# Patient Record
Sex: MALE | Race: WHITE | Employment: OTHER | URBAN - NONMETROPOLITAN AREA
[De-identification: names, ages, dates, MRNs, and addresses within clinical notes are randomized per-mention and may not be internally consistent; named-entity substitution may affect disease eponyms.]

---

## 2020-10-21 ENCOUNTER — HOSPITAL ENCOUNTER (OUTPATIENT)
Dept: OCCUPATIONAL THERAPY | Age: 80
Setting detail: THERAPIES SERIES
Discharge: HOME | End: 2020-10-21
Payer: MEDICARE

## 2021-11-09 ENCOUNTER — HOSPITAL ENCOUNTER (OUTPATIENT)
Dept: OCCUPATIONAL THERAPY | Age: 81
Setting detail: THERAPIES SERIES
End: 2021-11-09
Payer: MEDICARE

## 2021-11-10 ENCOUNTER — HOSPITAL ENCOUNTER (OUTPATIENT)
Dept: OCCUPATIONAL THERAPY | Age: 81
Setting detail: THERAPIES SERIES
Discharge: HOME | End: 2021-11-10
Payer: MEDICARE

## 2021-11-10 PROCEDURE — 97165 OT EVAL LOW COMPLEX 30 MIN: CPT

## 2021-11-10 NOTE — DISCHARGE SUMMARY
[address]  DRIVING EVALUATION    PATIENT: Devi Mcnair OF BIRTH:  1940  GENDER:  male  CSN: [id number]   REFERRING PHYSICIAN:   CALI Calderón CNP  DIAGNOSIS:  R41.89 cognitive change  DRIVING HISTORY:   Reports that the driving evaluation was ordered due to a medication that he is currently taking. Wife answering questions for history, patient contributing. Wife reports that patient drives 0-3O a week, within a mile of the home. PMH: Please see medical history questionnaire for past medical history, allergies, and medications. INSURANCE INFORMATION: Medicare  PATIENT GOALS:    Continue driving    OBJECTIVE  VISUAL SKILLS(using the OPTOptiNose 2000 Visual Evaluator)       FAR VISUAL ACUITY:   Pass. Right eye 20/40, Left eye 20/40, bilateral 20/40     STEREO DEPTH:   Fail. FUSION:    Pass. Arrow pointing to number 9    PERIPHERAL VISION:  Pass. Able to detect a flash of light on the right and the left at nasal, 55, 70 and 85 degrees. DYNAVISION TESTING:  Mode A 60 seconds self paced interval with B UE and 30 hits. Considered slow for this activity. PHYSICAL SKILLS  RANGE OF MOTION:Within functional limits for driving  STRENGTH: Within functional limits for driving  TRANSFER IN/OUT OF SIMULATOR: Within functional limits for driving  AMBULATION: Within functional limits for driving    IN VEHICLE MANIPULATION SKILLS:  Steering Wheel:Within functional limits for driving   Gas Pedal:  Within functional limits for driving  Brake Pedal: During the reaction time simulation, patient placing foot on the gas and the brake simulataneously.   Turn Signal: NT  Seat Belt:NT     COGNITIVE SKILLS  ORIENTATION:  Within functional limits for driving  ATTENTION SPAN:Impaired: Decreased ability for divided attention demonstrated this date, both pen and paper and during driving simulation  FRUSTRATION TOLERANCE:Within functional limits for driving  IMPULSIVITY:Within functional limits for driving  DIRECTION FOLLOWING:Within functional limits for driving  R/L DISCRIMINATION:Within functional limits for driving  MEMORY:Within functional limits for driving      COGNITIVE TESTING:  Trail Making Part A: 70 seconds (average 29 seconds, deficient is greater than 78 seconds)  Trail Making Part B: Unable to correctly complete (just made a trail with numbers only)  Clock Test: 3/4     SHORT BLESSED TEST:   The Short Blessed Test is a screening tool to assess orientation, short term memory, long term memory, and reversal of learning. Overall this patient received a score of  0-4 which indicates normal cognition. Jessica Holbrook scored a 2 this date.  (required a prompt for reversal of months of the year)    IF SCORE IS IMPAIRED OR QUESTIONABLY IMPAIRED (See Below):    Patient's strengths were:  [x] short-term memory    [] long-term memory  [] reversal of learning   [x] orientation. SIMULATED DRIVING ASSESSMENT:  WARM-UP:    (54 MPH, 2 kay[address] highway with several curves. Light on-coming traffic. There are no intersections, pedestrians, cross traffic, slow traffic, etc.)    Total off road crashes: 0 (Good performance is 0)   Total collisions with vehicles and roadway objects: 0 (Good performance is 0)   Percentage of time over the posted speed limit: 1% (Good performance is within +/- 5% of the posted speed limit.)   Percentage of time out of lanes: 11% (Good performance would be less than 5%, moderate is less than 10%, greater than 10% is considered poor.)   Note: no repetition of instruction required     BRAKE REACTION TIME  (The brake reaction time test consists of presenting a large stop sign in the center of the visual display.  The appropriate response if for the  to release the gas and apply the brakes as quickly as possible.)     Total pedal reaction time: 0.88 seconds (Average value is below 0.7 seconds.)   Gas pedal reaction time: 0.56 (For good performance, this should be less than 0.4 seconds; poor performance is above 0.55 seconds)   Stopping distance: 197 feet (Good performance is less than 175 feet, moderate is between 175 and 220 feet, greater than 220 feet is considered poor.)   Speed at stimulus: 49 mph   Notes: foot partially on the gas when pressing on the brake for each trial    SIMULATED DRIVING TREATMENT:  WIDE FIELD DIVIDED ATTENTION:  (In this task, the  will navigate a two kayy, six mile highway with both curves and hills. There will be no obstacles in front of the  but there will be on-coming traffic. Divided Attention (DA) events are presented, however they will only be presented on the side monitors thus forcing the  to scan the entire scene, not just the center monitor. The DA events include only left and right arrows. The  will be presented with sixteen different DA events. The DA events will appear on the outside half of the left and right monitors with eight symbols being shown in the upper quadrant and four will be shown in the lower quadrant. The  will also need to maintain their speed and kayy position during the drive.)     Total off road crashes: 0 (Good performance is 0)   Total collisions with vehicles and roadway objects: 0 (Good performance is 0)   Percentage of time over the posted speed limit: 12% (Good performance would be less than 5%, moderate would be less than 10% and anything greater than 10% would be poor)   Percentage of time out of lanes: 12% (Good performance would be less than 1%, moderate would be less than 2.5% and anything greater than 2.5% would be poor)   Total correct attention responses: 5/16  (Good performance is all symbols responded to correctly, moderate performance would be missing 1-2.)   Number of left symbols missed: 3/8    Number of right symbols missed: 8/8    Average speed: 53 mph (Good performance is +/- 5 MPH of the posted speed limit.  Average speed on this drive is 48 MPH.)   Maximum speed: 66 mph   Minimum speed: 45 mph   Notes: difficulty dividing attention and difficulty maintaining speed and kayy        ASSESSMENT AND PLAN    RESULTS: Patient tested unsafe to return to independent driving. Patient demonstrated decreased speed with reaction time and decreased ability for divided attention as needed for driving. RECOMMENDATIONS:   Patient has been instructed to contact referring physician to discuss results of this evaluation. Patient has been informed that his or her physician is responsible for making the final decision regarding driving status. Thank you for this referral.    History: Low Complexity: brief history completed. Reviewed medical and therapy records related to presenting problem    Performance Deficits/Examination: Low Complexity: 1-2 performance deficits related to presenting problem that result in activity limitations or participation restrictions. Clinical Decision Making: Clinical Decision making was of Low Complexity as the result of analysis of data from a problem focused assessment, a consideration of a limited number of treatment options, no significant comorbidities affecting the plan of care and no modification or assistance required to complete the evaluation. Evaluation Complexity: Based on the findings of patient history, examination, clinical presentation, and decision making during this evaluation, this patient is of low complexity.     Time in: 1000  Time out: 1110  Timed treatment: 0  Total time: 70 minutes          Franco Dodd, OTR/L [id number]